# Patient Record
Sex: FEMALE | Race: WHITE | NOT HISPANIC OR LATINO | Employment: FULL TIME | URBAN - METROPOLITAN AREA
[De-identification: names, ages, dates, MRNs, and addresses within clinical notes are randomized per-mention and may not be internally consistent; named-entity substitution may affect disease eponyms.]

---

## 2019-12-30 NOTE — PROGRESS NOTES
Dianne Najera presents today to establish care.    CHIEF COMPLAINT:  Establish Care (new patient); Anxiety; Depression; and motion sickness    HPI     Anxiety/Depression: chronic, controlled with effexor 225mg daily.  Pt need new prescription as she changed insurances and her new insurance will not cover the 225mg, but will cover 3 75mg tablets daily. Seeing therapist and the therapist recommended she she a psychiatrist for med management.  She made an appointment, but the earliest she could get in was 6 months from now.     Motion sickness:  Traveling to [place] and gets extreme motion sickness.  Has used patches in the past and they have worked well for her.     Hx migraines: thinks they are menses/hormone related and stress related.   Is interested in contraception management.     Review of Systems   Constitutional: Negative for chills, diaphoresis, fatigue, fever and unexpected weight loss.   Eyes: Negative for blurred vision and visual disturbance.   Respiratory: Negative for cough and shortness of breath.    Cardiovascular: Negative for chest pain, palpitations and leg swelling.   Gastrointestinal: Negative for abdominal pain, constipation, diarrhea, nausea and vomiting.   Musculoskeletal: Negative for neck stiffness.   Skin: Negative for rash.   Neurological: Negative for dizziness, light-headedness and headache.   Psychiatric/Behavioral: Positive for stress. Negative for sleep disturbance and depressed mood. The patient is not nervous/anxious.       Past Medical History:   Diagnosis Date   • Migraines    • Pap smear for cervical cancer screening 2018   • Seizures (CMS/HCC)     has had 4 total in her lifetime      Past Surgical History:   Procedure Laterality Date   • WISDOM TOOTH EXTRACTION        Family History   Problem Relation Age of Onset   • Hypotension Mother    • Migraines Mother    • Hypertension Father    • Heart failure Maternal Grandmother    • Esophageal cancer Maternal Grandfather       Social  "History     Socioeconomic History   • Marital status: Single     Spouse name: Not on file   • Number of children: Not on file   • Years of education: Not on file   • Highest education level: Not on file   Tobacco Use   • Smoking status: Never Smoker   • Smokeless tobacco: Never Used   Substance and Sexual Activity   • Alcohol use: Yes     Drinks per session: 1 or 2   • Drug use: Never   • Sexual activity: Defer   Lifestyle   • Physical activity:     Days per week: 4 days     Minutes per session: 30 min   • Stress: To some extent        Current Outpatient Medications:   •  ondansetron ODT (ZOFRAN ODT) 8 MG disintegrating tablet, Place 1 tablet on the tongue Every 8 (Eight) Hours As Needed for Nausea or Vomiting for up to 10 days., Disp: 12 tablet, Rfl: 0  •  Scopolamine (TRANSDERM-SCOP, 1.5 MG,) 1.5 MG/3DAYS patch, Place 1 patch on the skin as directed by provider Every 72 (Seventy-Two) Hours., Disp: 10 patch, Rfl: 0  •  venlafaxine XR (EFFEXOR-XR) 75 MG 24 hr capsule, Take 3 capsules by mouth Daily for 30 days., Disp: 90 capsule, Rfl: 5    No Known Allergies     Visit Vitals  BP 98/64 (BP Location: Right arm, Patient Position: Sitting)   Pulse 65   Temp 97 °F (36.1 °C) (Temporal)   Ht 167.6 cm (66\")   Wt 91.4 kg (201 lb 6.4 oz)   SpO2 98%   BMI 32.51 kg/m²      Physical Exam   Constitutional: She is oriented to person, place, and time. Vital signs are normal. She appears well-developed and well-nourished. She is cooperative.  Non-toxic appearance. She does not have a sickly appearance. She does not appear ill. No distress. She is obese.  HENT:   Head: Normocephalic.   Right Ear: Hearing normal.   Left Ear: Hearing normal.   Eyes: Pupils are equal, round, and reactive to light. Conjunctivae are normal.   Cardiovascular: Normal rate, regular rhythm and normal heart sounds.   Pulmonary/Chest: Effort normal and breath sounds normal. No respiratory distress. She has no wheezes.   Neurological: She is alert and oriented " to person, place, and time. She has normal strength.   Skin: Skin is warm, dry and intact. No rash noted. She is not diaphoretic.   Psychiatric: She has a normal mood and affect. Her speech is normal and behavior is normal. Judgment and thought content normal.   Vitals reviewed.     ASSESSMENT/PLAN  Diagnoses and all orders for this visit:    1. Anxiety (Primary)  -     venlafaxine XR (EFFEXOR-XR) 75 MG 24 hr capsule; Take 3 capsules by mouth Daily for 30 days.  Dispense: 90 capsule; Refill: 5  Stable.  Continue taking effexor as prescribed.  Will refill medication.  Provided patient with alternative psychiatry group if she would like to get in before 6 months.  Continue therapy.      2. Recurrent major depressive disorder, remission status unspecified (CMS/HCC)  -     venlafaxine XR (EFFEXOR-XR) 75 MG 24 hr capsule; Take 3 capsules by mouth Daily for 30 days.  Dispense: 90 capsule; Refill: 5  Stable.  Continue taking effexor as prescribed.  Will refill medication.  Provided patient with alternative psychiatry group if she would like to get in before 6 months.  Continue therapy.  To ER for SI/HI.    3. Motion sickness, initial encounter  -     Scopolamine (TRANSDERM-SCOP, 1.5 MG,) 1.5 MG/3DAYS patch; Place 1 patch on the skin as directed by provider Every 72 (Seventy-Two) Hours.  Dispense: 10 patch; Refill: 0  -     ondansetron ODT (ZOFRAN ODT) 8 MG disintegrating tablet; Place 1 tablet on the tongue Every 8 (Eight) Hours As Needed for Nausea or Vomiting for up to 10 days.  Dispense: 12 tablet; Refill: 0  Will prescribe scopolamine patches and Zofran as needed for motion sickness.     4. Encounter for counseling regarding contraception  -     Ambulatory Referral to Obstetrics / Gynecology  Will refer to OB/GYN for well women exam and contraceptive management.     Return in about 3 months (around 3/31/2020) for Annual.    Patient instructed to follow up with our office for results on any labs/imaging ordered during  this visit.  Patient verbalizes understanding and agrees to treatment plan.

## 2019-12-31 ENCOUNTER — OFFICE VISIT (OUTPATIENT)
Dept: INTERNAL MEDICINE | Facility: CLINIC | Age: 24
End: 2019-12-31

## 2019-12-31 VITALS
HEIGHT: 66 IN | HEART RATE: 65 BPM | SYSTOLIC BLOOD PRESSURE: 98 MMHG | WEIGHT: 201.4 LBS | BODY MASS INDEX: 32.37 KG/M2 | DIASTOLIC BLOOD PRESSURE: 64 MMHG | OXYGEN SATURATION: 98 % | TEMPERATURE: 97 F

## 2019-12-31 DIAGNOSIS — F33.9 RECURRENT MAJOR DEPRESSIVE DISORDER, REMISSION STATUS UNSPECIFIED (HCC): ICD-10-CM

## 2019-12-31 DIAGNOSIS — F41.9 ANXIETY: Primary | ICD-10-CM

## 2019-12-31 DIAGNOSIS — T75.3XXA MOTION SICKNESS, INITIAL ENCOUNTER: ICD-10-CM

## 2019-12-31 DIAGNOSIS — Z30.09 ENCOUNTER FOR COUNSELING REGARDING CONTRACEPTION: ICD-10-CM

## 2019-12-31 PROCEDURE — 99204 OFFICE O/P NEW MOD 45 MIN: CPT | Performed by: NURSE PRACTITIONER

## 2019-12-31 RX ORDER — VENLAFAXINE HYDROCHLORIDE 225 MG/1
1 TABLET, EXTENDED RELEASE ORAL DAILY
COMMUNITY
Start: 2019-12-21 | End: 2019-12-31 | Stop reason: SDUPTHER

## 2019-12-31 RX ORDER — ONDANSETRON 8 MG/1
8 TABLET, ORALLY DISINTEGRATING ORAL EVERY 8 HOURS PRN
Qty: 12 TABLET | Refills: 0 | Status: SHIPPED | OUTPATIENT
Start: 2019-12-31 | End: 2020-01-10

## 2019-12-31 RX ORDER — SCOLOPAMINE TRANSDERMAL SYSTEM 1 MG/1
1 PATCH, EXTENDED RELEASE TRANSDERMAL
Qty: 10 PATCH | Refills: 0 | Status: SHIPPED | OUTPATIENT
Start: 2019-12-31 | End: 2020-08-24

## 2019-12-31 RX ORDER — VENLAFAXINE HYDROCHLORIDE 75 MG/1
225 CAPSULE, EXTENDED RELEASE ORAL DAILY
Qty: 90 CAPSULE | Refills: 5 | Status: SHIPPED | OUTPATIENT
Start: 2019-12-31 | End: 2020-08-24

## 2020-03-27 ENCOUNTER — TELEPHONE (OUTPATIENT)
Dept: INTERNAL MEDICINE | Facility: CLINIC | Age: 25
End: 2020-03-27

## 2020-07-16 ENCOUNTER — TELEPHONE (OUTPATIENT)
Dept: INTERNAL MEDICINE | Facility: CLINIC | Age: 25
End: 2020-07-16

## 2020-07-21 RX ORDER — VENLAFAXINE HYDROCHLORIDE 75 MG/1
225 CAPSULE, EXTENDED RELEASE ORAL DAILY
Qty: 90 CAPSULE | Refills: 0 | Status: SHIPPED | OUTPATIENT
Start: 2020-07-21 | End: 2020-08-24

## 2020-07-21 NOTE — TELEPHONE ENCOUNTER
I spoke with patient and she does not have enough of the venlaflaxine ER 75 mg to get her through until an appointment with psych.  Can you send in 30 days until she can be seen.  I made her an appointment next week for a physical with Dr. Rothman.

## 2020-07-27 ENCOUNTER — TELEPHONE (OUTPATIENT)
Dept: INTERNAL MEDICINE | Facility: CLINIC | Age: 25
End: 2020-07-27

## 2020-07-27 NOTE — TELEPHONE ENCOUNTER
PT CALLED AND SAID SHE HAS HAD POSSIBLE EXPOSURE TO THE VIRUS AND WANTED TO KNOW ABOUT TESTING; SHE HAS NO SYMPTOMS; PLEASE ADVISE    DIMA: [phone]

## 2020-08-24 ENCOUNTER — OFFICE VISIT (OUTPATIENT)
Dept: INTERNAL MEDICINE | Facility: CLINIC | Age: 25
End: 2020-08-24

## 2020-08-24 VITALS
SYSTOLIC BLOOD PRESSURE: 118 MMHG | HEART RATE: 83 BPM | WEIGHT: 210 LBS | DIASTOLIC BLOOD PRESSURE: 82 MMHG | HEIGHT: 66 IN | BODY MASS INDEX: 33.75 KG/M2 | TEMPERATURE: 97.3 F | OXYGEN SATURATION: 98 %

## 2020-08-24 DIAGNOSIS — F32.A ANXIETY AND DEPRESSION: Primary | ICD-10-CM

## 2020-08-24 DIAGNOSIS — F41.9 ANXIETY AND DEPRESSION: Primary | ICD-10-CM

## 2020-08-24 PROCEDURE — 99214 OFFICE O/P EST MOD 30 MIN: CPT | Performed by: FAMILY MEDICINE

## 2020-08-24 RX ORDER — VENLAFAXINE HYDROCHLORIDE 75 MG/1
225 CAPSULE, EXTENDED RELEASE ORAL DAILY
Qty: 90 CAPSULE | Refills: 1 | Status: SHIPPED | OUTPATIENT
Start: 2020-08-24 | End: 2020-10-23 | Stop reason: SDUPTHER

## 2020-08-24 NOTE — PATIENT INSTRUCTIONS
Generalized Anxiety Disorder, Adult  Generalized anxiety disorder (FRANCIS) is a mental health disorder. People with this condition constantly worry about everyday events. Unlike normal anxiety, worry related to FRANCIS is not triggered by a specific event. These worries also do not fade or get better with time. FRANCIS interferes with life functions, including relationships, work, and school.  FRANCIS can vary from mild to severe. People with severe FRANCIS can have intense waves of anxiety with physical symptoms (panic attacks).  What are the causes?  The exact cause of FRANCIS is not known.  What increases the risk?  This condition is more likely to develop in:  · Women.  · People who have a family history of anxiety disorders.  · People who are very shy.  · People who experience very stressful life events, such as the death of a loved one.  · People who have a very stressful family environment.  What are the signs or symptoms?  People with FRANCIS often worry excessively about many things in their lives, such as their health and family. They may also be overly concerned about:  · Doing well at work.  · Being on time.  · Natural disasters.  · Friendships.  Physical symptoms of FRANCIS include:  · Fatigue.  · Muscle tension or having muscle twitches.  · Trembling or feeling shaky.  · Being easily startled.  · Feeling like your heart is pounding or racing.  · Feeling out of breath or like you cannot take a deep breath.  · Having trouble falling asleep or staying asleep.  · Sweating.  · Nausea, diarrhea, or irritable bowel syndrome (IBS).  · Headaches.  · Trouble concentrating or remembering facts.  · Restlessness.  · Irritability.  How is this diagnosed?  Your health care provider can diagnose FRANCIS based on your symptoms and medical history. You will also have a physical exam. The health care provider will ask specific questions about your symptoms, including how severe they are, when they started, and if they come and go. Your health care  provider may ask you about your use of alcohol or drugs, including prescription medicines. Your health care provider may refer you to a mental health specialist for further evaluation.  Your health care provider will do a thorough examination and may perform additional tests to rule out other possible causes of your symptoms.  To be diagnosed with FRANCIS, a person must have anxiety that:  · Is out of his or her control.  · Affects several different aspects of his or her life, such as work and relationships.  · Causes distress that makes him or her unable to take part in normal activities.  · Includes at least three physical symptoms of FRANCIS, such as restlessness, fatigue, trouble concentrating, irritability, muscle tension, or sleep problems.  Before your health care provider can confirm a diagnosis of FRANCIS, these symptoms must be present more days than they are not, and they must last for six months or longer.  How is this treated?  The following therapies are usually used to treat FRANCIS:  · Medicine. Antidepressant medicine is usually prescribed for long-term daily control. Antianxiety medicines may be added in severe cases, especially when panic attacks occur.  · Talk therapy (psychotherapy). Certain types of talk therapy can be helpful in treating FRANCIS by providing support, education, and guidance. Options include:  ? Cognitive behavioral therapy (CBT). People learn coping skills and techniques to ease their anxiety. They learn to identify unrealistic or negative thoughts and behaviors and to replace them with positive ones.  ? Acceptance and commitment therapy (ACT). This treatment teaches people how to be mindful as a way to cope with unwanted thoughts and feelings.  ? Biofeedback. This process trains you to manage your body's response (physiological response) through breathing techniques and relaxation methods. You will work with a therapist while machines are used to monitor your physical symptoms.  · Stress  management techniques. These include yoga, meditation, and exercise.  A mental health specialist can help determine which treatment is best for you. Some people see improvement with one type of therapy. However, other people require a combination of therapies.  Follow these instructions at home:  · Take over-the-counter and prescription medicines only as told by your health care provider.  · Try to maintain a normal routine.  · Try to anticipate stressful situations and allow extra time to manage them.  · Practice any stress management or self-calming techniques as taught by your health care provider.  · Do not punish yourself for setbacks or for not making progress.  · Try to recognize your accomplishments, even if they are small.  · Keep all follow-up visits as told by your health care provider. This is important.  Contact a health care provider if:  · Your symptoms do not get better.  · Your symptoms get worse.  · You have signs of depression, such as:  ? A persistently sad, cranky, or irritable mood.  ? Loss of enjoyment in activities that used to bring you michelle.  ? Change in weight or eating.  ? Changes in sleeping habits.  ? Avoiding friends or family members.  ? Loss of energy for normal tasks.  ? Feelings of guilt or worthlessness.  Get help right away if:  · You have serious thoughts about hurting yourself or others.  If you ever feel like you may hurt yourself or others, or have thoughts about taking your own life, get help right away. You can go to your nearest emergency department or call:  · Your local emergency services (911 in the [place]  · A suicide crisis helpline, such as the National Suicide Prevention Lifeline at 1-512.162.3378. This is open 24 hours a day.  Summary  · Generalized anxiety disorder (FRANCIS) is a mental health disorder that involves worry that is not triggered by a specific event.  · People with FRANCIS often worry excessively about many things in their lives, such as their health and  family.  · FRANCIS may cause physical symptoms such as restlessness, trouble concentrating, sleep problems, frequent sweating, nausea, diarrhea, headaches, and trembling or muscle twitching.  · A mental health specialist can help determine which treatment is best for you. Some people see improvement with one type of therapy. However, other people require a combination of therapies.  This information is not intended to replace advice given to you by your health care provider. Make sure you discuss any questions you have with your health care provider.  Document Released: 04/14/2014 Document Revised: 11/30/2018 Document Reviewed: 11/07/2017  Elsevier Patient Education © 2020 [employer]

## 2020-08-24 NOTE — PROGRESS NOTES
Subjective     Dianne Najera is a 24 y.o. female.     Chief Complaint   Patient presents with   • Depression     f/u   • Anxiety       History of Present Illness     Pt WAS FOLLOWING WITH ELSI Castro  To Est[facility] care with me today.       Anxiety/Depression:   CHRONIC , DX MANY YEARS AGO.  Her sx when she has anxiety are HA, racing HR, feels dizzy, poor conc.  Her anxiety worse with stress and everything around her make her anxious.  She is on Effexor 225 mg daily for long time, no S/E reported.  She is following with therapist   Looking for Psych to accept her insurance.  Her sx well tolerated with current Rx.  Denies SI/SA  Her insurance dose not cover the 225mg, but will cover 3 pills of 75mg tablets daily.     The following portions of the patient's history were reviewed and updated as appropriate: allergies, current medications, past family history, past medical history, past social history, past surgical history and problem list.    Review of Systems   Constitutional: Negative for activity change, appetite change, chills, fatigue and fever.   HENT: Negative for congestion, ear discharge, ear pain, rhinorrhea, sore throat, swollen glands, trouble swallowing and voice change.    Eyes: Negative for discharge and visual disturbance.   Respiratory: Negative for apnea, cough, chest tightness, shortness of breath, wheezing and stridor.    Cardiovascular: Negative for chest pain, palpitations and leg swelling.   Gastrointestinal: Negative for abdominal distention, abdominal pain, nausea and vomiting.   Endocrine: Negative for cold intolerance and heat intolerance.   Genitourinary: Negative for decreased urine volume, difficulty urinating, dysuria, vaginal bleeding, vaginal discharge and vaginal pain.   Musculoskeletal: Negative for arthralgias, back pain and neck pain.   Skin: Negative for color change, pallor and rash.   Neurological: Negative for dizziness, seizures, speech difficulty, headache, memory problem and  confusion.   Psychiatric/Behavioral: Positive for stress. Negative for agitation, behavioral problems, decreased concentration, sleep disturbance, suicidal ideas and depressed mood. The patient is nervous/anxious.    All other systems reviewed and are negative.      Vitals:    08/24/20 1542   BP: 118/82   Pulse: 83   Temp: 97.3 °F (36.3 °C)   SpO2: 98%           08/24/20  1542   Weight: 95.3 kg (210 lb)         Body mass index is 33.89 kg/m².      Current Outpatient Medications   Medication Sig Dispense Refill   • venlafaxine XR (EFFEXOR-XR) 75 MG 24 hr capsule Take 3 capsules by mouth Daily. 90 capsule 1     No current facility-administered medications for this visit.      PHQ-2 Depression Screening  Little interest or pleasure in doing things? 0   Feeling down, depressed, or hopeless? 0   PHQ-2 Total Score 0                Objective   Physical Exam   Constitutional: She is oriented to person, place, and time. She appears well-developed and well-nourished. No distress.   HENT:   Head: Normocephalic.   Mouth/Throat: Oropharynx is clear and moist.   Eyes: Conjunctivae are normal.   Neck: Normal range of motion. No thyromegaly present.   Cardiovascular: Normal rate, regular rhythm and normal heart sounds.   No murmur heard.  Pulmonary/Chest: Effort normal and breath sounds normal. No respiratory distress. She has no wheezes.   Neurological: She is alert and oriented to person, place, and time.   Skin: Skin is warm. No rash noted. No erythema. No pallor.   Psychiatric: She has a normal mood and affect. Her behavior is normal. Judgment and thought content normal.   Nursing note and vitals reviewed.        Assessment/Plan   Dianne was seen today for depression and anxiety.    Diagnoses and all orders for this visit:    Anxiety and depression  -     venlafaxine XR (EFFEXOR-XR) 75 MG 24 hr capsule; Take 3 capsules by mouth Daily.               I have fully discussed with patient physical examination findings, results of any  lab tests, diagnosis,  plan of treatment and follow up or referrals  I have discussed the SIDE EFFECT OF MEDICATION and importance TO report any side effect , the patient expressed good understanding.     Screening for Depression done , please see [facility] PHQ 2/9     Home care discussed     All questions answered  Patient verbalized full understanding and agrees with the plan .

## 2020-09-10 ENCOUNTER — OFFICE VISIT (OUTPATIENT)
Dept: INTERNAL MEDICINE | Facility: CLINIC | Age: 25
End: 2020-09-10

## 2020-09-10 VITALS
BODY MASS INDEX: 33.46 KG/M2 | DIASTOLIC BLOOD PRESSURE: 78 MMHG | TEMPERATURE: 97.1 F | SYSTOLIC BLOOD PRESSURE: 108 MMHG | WEIGHT: 208.2 LBS | HEART RATE: 97 BPM | OXYGEN SATURATION: 98 % | HEIGHT: 66 IN

## 2020-09-10 DIAGNOSIS — R87.619 ABNORMAL CERVICAL PAPANICOLAOU SMEAR, UNSPECIFIED ABNORMAL PAP FINDING: ICD-10-CM

## 2020-09-10 DIAGNOSIS — D22.9 BENIGN MOLE: Primary | ICD-10-CM

## 2020-09-10 PROBLEM — R87.610 ATYPICAL SQUAMOUS CELLS OF UNDETERMINED SIGNIFICANCE ON CYTOLOGIC SMEAR OF CERVIX (ASC-US): Status: ACTIVE | Noted: 2020-09-10

## 2020-09-10 PROCEDURE — 99213 OFFICE O/P EST LOW 20 MIN: CPT | Performed by: FAMILY MEDICINE

## 2020-09-10 NOTE — PROGRESS NOTES
Subjective     Dianne Najera is a 25 y.o. female.     Chief Complaint   Patient presents with   • .Bump     bump on back of left earlobe (black spot) Not sure how how long-noted 2 days ago       History of Present Illness     Pt noticed black spot on her Lt earlobe  2 days ago, not sure for how long she has it, dose not hurt.no redness/swelling.  She has many moles on her body.    H/o abnormal Pap smear 2018 when she was in [place] , was considering to get LEEP in [place] but her 6 month PAP was ok  concidering to do leep in [place]. DUE FOR ANOTHER PAP.  No h/o STD      The following portions of the patient's history were reviewed and updated as appropriate: allergies, current medications, past family history, past medical history, past social history, past surgical history and problem list.    Review of Systems   Constitutional: Negative for activity change, appetite change, chills, fatigue and fever.   Respiratory: Negative for cough, choking, shortness of breath, wheezing and stridor.    Cardiovascular: Negative for chest pain, palpitations and leg swelling.   Genitourinary: Negative for decreased urine volume, menstrual problem, urgency, urinary incontinence, vaginal bleeding, vaginal discharge and vaginal pain.   Musculoskeletal: Negative for arthralgias, back pain and gait problem.   Skin: Positive for color change and skin lesions.   Psychiatric/Behavioral: Negative for agitation, behavioral problems, decreased concentration, sleep disturbance, suicidal ideas and stress. The patient is nervous/anxious.    All other systems reviewed and are negative.      Vitals:    09/10/20 0841   BP: 108/78   Pulse: 97   Temp: 97.1 °F (36.2 °C)   SpO2: 98%           09/10/20  0841   Weight: 94.4 kg (208 lb 3.2 oz)         Body mass index is 33.6 kg/m².      Current Outpatient Medications   Medication Sig Dispense Refill   • venlafaxine XR (EFFEXOR-XR) 75 MG 24 hr capsule Take 3 capsules by mouth Daily. 90 capsule 1     No current  facility-administered medications for this visit.             Objective   Physical Exam   Constitutional: She is oriented to person, place, and time. She appears well-nourished. No distress.   HENT:   Mouth/Throat: Oropharynx is clear and moist.   Eyes: Conjunctivae are normal.   Neck: Neck supple.   Cardiovascular: Normal rate, regular rhythm and normal heart sounds.   No murmur heard.  Neurological: She is alert and oriented to person, place, and time.   Skin: Skin is warm.   0.3 x 0.3 cm mole at the Lt earlobe , no color Changes with no irregularity , surface looks smooth.    Many moles of different sizes distributed on her body   Psychiatric: She has a normal mood and affect. Her behavior is normal. Judgment and thought content normal.   Nursing note and vitals reviewed.        Assessment/Plan   Dianne was seen today for .bump.    Diagnoses and all orders for this visit:    Benign mole;  - Close monitoring for any change in size/color /irregularity     Abnormal cervical Papanicolaou smear, unspecified abnormal pap finding;  - She was not sure what was her previous PAP, will get record from her previous GYN at [place]  - PAP next visit     I have fully discussed the nature of the medical condition(s) risks, complications, management, safe and proper use of medications.     Encouraged medication compliance and the importance of keeping scheduled follow up appointments with me and any other providers.    Patient instructed to follow up with our office for results on any labs/imaging ordered during this visit.    Home care discussed  All questions answered  Patient verbalizes understanding and agrees to treatment plan.

## 2020-09-10 NOTE — PATIENT INSTRUCTIONS
Mole  A mole is a colored (pigmented) growth on the skin. Moles are very common. They are usually harmless, but some moles can become cancerous over time.  What are the causes?  Moles are caused when pigmented skin cells grow together in clusters instead of spreading out in the skin as they normally do. The reason why the skin cells grow together in clusters is not known.  What increases the risk?  You are more likely to develop a mole if you:  · Have family members who have moles.  · Are white.  · Have blond hair.  · Are often outdoors and exposed to the sun.  · Received phototherapy when you were a  baby.  · Are male.  What are the signs or symptoms?  A mole may be:  · Brown or black.  · Flat or raised.  · Smooth or wrinkled.  How is this diagnosed?  A mole is diagnosed with a skin exam. If your health care provider thinks a mole may be cancerous, all or part of the mole will be removed for testing (biopsy).  How is this treated?  Most moles are noncancerous (benign) and do not require treatment. If a mole is found to be cancerous, it will be removed. You may also choose to have a mole removed if it is causing pain or if you do not like the way it looks.  Follow these instructions at home:    General instructions    · Every month, look for new moles and check your existing moles for changes. This is important because a change in a mole can mean that the mole has become cancerous.  · ABCDE changes in a mole indicate that you should be evaluated by your health care provider. ABCDE stands for:  ? Asymmetry. This means the mole has an irregular shape. It is not round or oval.  ? Border. This means the mole has an irregular or bumpy border.  ? Color. This means the mole has multiple colors in it, including brown, black, blue, red, or tan. Note that it is normal for moles to get darker when a woman is pregnant or takes birth control pills.  ? Diameter. This means the mole is more than 0.2 inches (6 mm)  across.  ? Evolving. This refers to any unusual changes or symptoms in the mole, such as pain, itching, stinging, sensitivity, or bleeding.  · If you have a large number of moles, see a skin doctor (dermatologist) at least one time every year for a full-body skin check.  Lifestyle  · When you are outdoors, wear sunscreen with SPF 30 (sun protection factor 30) or higher.  · Use an adequate amount of sunscreen to cover exposed areas of skin. Put it on 30 minutes before you go out. Reapply it every 2 hours or anytime you come out of the water.  · When you are out in the sun, wear a broad-brimmed hat and clothing that covers your arms and legs. Wear wraparound sunglasses.  Contact a health care provider if:  · The size, shape, borders, or color of your mole changes.  · Your mole, or the skin near the mole, becomes painful, sore, red, or swollen.  · Your mole:  ? Develops more than one color.  ? Itches or bleeds.  ? Becomes scaly, sheds skin, or oozes fluid.  ? Becomes flat or develops raised areas.  ? Becomes hard or soft.  · You develop a new mole.  Summary  · A mole is a colored (pigmented) growth on the skin. Moles are very common. They are usually harmless, but some moles can become cancerous over time.  · Every month, look for new moles and check your existing moles for changes. This is important because a change in a mole can mean that the mole has become cancerous.  · If you have a large number of moles, see a skin doctor (dermatologist) at least one time every year for a full-body skin check.  · When you are outdoors, wear sunscreen with SPF 30 (sun protection factor 30) or higher. Reapply it every 2 hours or anytime you come out of the water.  · Contact a health care provider if you notice changes in a mole or if you develop a new mole.  This information is not intended to replace advice given to you by your health care provider. Make sure you discuss any questions you have with your health care  provider.  Document Released: 09/12/2002 Document Revised: 05/14/2019 Document Reviewed: 05/14/2019  Elsevier Patient Education © 2020 [employer]

## 2020-10-23 DIAGNOSIS — F41.9 ANXIETY AND DEPRESSION: ICD-10-CM

## 2020-10-23 DIAGNOSIS — F32.A ANXIETY AND DEPRESSION: ICD-10-CM

## 2020-10-23 NOTE — TELEPHONE ENCOUNTER
Caller: Dianne Najera    Relationship: Self    Best call back number: [phone]    Medication needed:   Requested Prescriptions     Pending Prescriptions Disp Refills   • venlafaxine XR (EFFEXOR-XR) 75 MG 24 hr capsule 90 capsule 1     Sig: Take 3 capsules by mouth Daily.       When do you need the refill by: 10/23/20    What details did the patient provide when requesting the medication: Patient cannot get in to see Psychology until 10/28/20.  Patient is asking for a refill until that appointment.    Does the patient have less than a 3 day supply:  [x] Yes  [] No    What is the patient's preferred pharmacy: [id number] - [place], [place] - [address] AT [facility] [address]  [phone] [facility] [phone] FX

## 2020-10-25 RX ORDER — VENLAFAXINE HYDROCHLORIDE 75 MG/1
225 CAPSULE, EXTENDED RELEASE ORAL DAILY
Qty: 90 CAPSULE | Refills: 1 | Status: SHIPPED | OUTPATIENT
Start: 2020-10-25

## 2020-10-26 ENCOUNTER — OFFICE VISIT (OUTPATIENT)
Dept: INTERNAL MEDICINE | Facility: CLINIC | Age: 25
End: 2020-10-26

## 2020-10-26 VITALS
DIASTOLIC BLOOD PRESSURE: 84 MMHG | TEMPERATURE: 97.3 F | WEIGHT: 208 LBS | HEIGHT: 66 IN | OXYGEN SATURATION: 98 % | SYSTOLIC BLOOD PRESSURE: 110 MMHG | HEART RATE: 95 BPM | BODY MASS INDEX: 33.43 KG/M2

## 2020-10-26 DIAGNOSIS — Z01.419 WOMEN'S ANNUAL ROUTINE GYNECOLOGICAL EXAMINATION: Primary | ICD-10-CM

## 2020-10-26 PROCEDURE — 99395 PREV VISIT EST AGE 18-39: CPT | Performed by: FAMILY MEDICINE

## 2020-10-26 NOTE — PROGRESS NOTES
Subjective     Dianne Najera is a 25 y.o. female.     Chief Complaint   Patient presents with   • Gynecologic Exam       History of Present Illness   Pt with h/o abnormal PAP in the past , then she had another PAP 6 months after , was neg per pt.  LMP was 10/5  Has normal cycle   No new concern  No abnormal discharge     The following portions of the patient's history were reviewed and updated as appropriate: allergies, current medications, past family history, past medical history, past social history, past surgical history and problem list.    Review of Systems   Gastrointestinal: Negative for abdominal pain, diarrhea, nausea and GERD.   Genitourinary: Negative for decreased urine volume, difficulty urinating, dyspareunia, dysuria, flank pain, frequency, genital sores, hematuria, menstrual problem, pelvic pain, pelvic pressure, urgency, urinary incontinence, vaginal bleeding, vaginal discharge and vaginal pain.   Musculoskeletal: Negative for arthralgias, back pain, gait problem and joint swelling.   Psychiatric/Behavioral: Negative for agitation, behavioral problems, decreased concentration, sleep disturbance, suicidal ideas and stress. The patient is not nervous/anxious.    All other systems reviewed and are negative.      Vitals:    10/26/20 1031   BP: 110/84   Pulse: 95   Temp: 97.3 °F (36.3 °C)   SpO2: 98%           10/26/20  1031   Weight: 94.3 kg (208 lb)         Body mass index is 33.57 kg/m².      Current Outpatient Medications   Medication Sig Dispense Refill   • venlafaxine XR (EFFEXOR-XR) 75 MG 24 hr capsule Take 3 capsules by mouth Daily. 90 capsule 1     No current facility-administered medications for this visit.                 Objective   Physical Exam  Vitals signs and nursing note reviewed. Exam conducted with a chaperone present.   Constitutional:       General: She is not in acute distress.     Appearance: She is well-developed. She is obese. She is not ill-appearing, toxic-appearing or  diaphoretic.   HENT:      Head: Normocephalic.      Mouth/Throat:      Mouth: Mucous membranes are moist.      Pharynx: Oropharynx is clear.   Neck:      Thyroid: No thyromegaly.   Abdominal:      General: Bowel sounds are normal. There is no distension.      Palpations: Abdomen is soft. There is no mass.      Tenderness: There is no abdominal tenderness. There is no guarding or rebound.      Hernia: No hernia is present. There is no hernia in the left inguinal area or right inguinal area.   Genitourinary:     General: Normal vulva.      Exam position: Lithotomy position.      Labia:         Right: No rash, tenderness, lesion or injury.         Left: No rash, tenderness, lesion or injury.       Urethra: No urethral lesion.      Vagina: No signs of injury and foreign body. No vaginal discharge, erythema, tenderness, bleeding or lesions.      Cervix: No discharge, friability, lesion or erythema.      Uterus: Not enlarged and not tender.       Adnexa:         Right: No mass or tenderness.          Left: No mass or tenderness.     Lymphadenopathy:      Lower Body: No right inguinal adenopathy. No left inguinal adenopathy.   Skin:     General: Skin is warm.      Coloration: Skin is not jaundiced or pale.      Findings: No erythema or rash.   Neurological:      Mental Status: She is alert and oriented to person, place, and time.      Motor: No abnormal muscle tone.      Deep Tendon Reflexes: Reflexes normal.   Psychiatric:         Mood and Affect: Mood normal.         Behavior: Behavior normal.         Thought Content: Thought content normal.         Judgment: Judgment normal.           Assessment/Plan   Diagnoses and all orders for this visit:    1. Women's annual routine gynecological examination (Primary)  -     Liquid-based Pap Smear, Screening          I have fully discussed the nature of the medical condition(s) risks, complications, management, safe and proper use of medications.   I have discussed the SIDE EFFECT  OF MEDICATION and importance TO report any side effect , the patient expressed good understanding.  Encouraged medication compliance and the importance of keeping scheduled follow up appointments with me and any other providers.    Patient instructed to follow up with our office for results on any labs/imaging ordered during this visit.    Home care discussed  All questions answered  Patient verbalizes understanding and agrees to treatment plan.

## 2020-10-26 NOTE — PATIENT INSTRUCTIONS
Pap Test  Why am I having this test?  A Pap test, also called a Pap smear, is a screening test to check for signs of:  · Cancer of the vagina, cervix, and uterus. The cervix is the lower part of the uterus that opens into the vagina.  · Infection.  · Changes that may be a sign that cancer is developing (precancerous changes).  Women need this test on a regular basis. In general, you should have a Pap test every 3 years until you reach menopause or age 65. Women aged 30-60 may choose to have their Pap test done at the same time as an HPV (human papillomavirus) test every 5 years (instead of every 3 years).  Your health care provider may recommend having Pap tests more or less often depending on your medical conditions and past Pap test results.  What kind of sample is taken?    Your health care provider will collect a sample of cells from the surface of your cervix. This will be done using a small cotton swab, plastic spatula, or brush. This sample is often collected during a pelvic exam, when you are lying on your back on an exam table with feet in footrests (stirrups).  In some cases, fluids (secretions) from the cervix or vagina may also be collected.  How do I prepare for this test?  · Be aware of where you are in your menstrual cycle. If you are menstruating on the day of the test, you may be asked to reschedule.  · You may need to reschedule if you have a known vaginal infection on the day of the test.  · Follow instructions from your health care provider about:  ? Changing or stopping your regular medicines. Some medicines can cause abnormal test results, such as digitalis and tetracycline.  ? Avoiding douching or taking a bath the day before or the day of the test.  Tell a health care provider about:  · Any allergies you have.  · All medicines you are taking, including vitamins, herbs, eye drops, creams, and over-the-counter medicines.  · Any blood disorders you have.  · Any surgeries you have had.  · Any  medical conditions you have.  · Whether you are pregnant or may be pregnant.  How are the results reported?  Your test results will be reported as either abnormal or normal.  A false-positive result can occur. A false positive is incorrect because it means that a condition is present when it is not.  A false-negative result can occur. A false negative is incorrect because it means that a condition is not present when it is.  What do the results mean?  A normal test result means that you do not have signs of cancer of the vagina, cervix, or uterus.  An abnormal result may mean that you have:  · Cancer. A Pap test by itself is not enough to diagnose cancer. You will have more tests done in this case.  · Precancerous changes in your vagina, cervix, or uterus.  · Inflammation of the cervix.  · An STD (sexually transmitted disease).  · A fungal infection.  · A parasite infection.  Talk with your health care provider about what your results mean.  Questions to ask your health care provider  Ask your health care provider, or the department that is doing the test:  · When will my results be ready?  · How will I get my results?  · What are my treatment options?  · What other tests do I need?  · What are my next steps?  Summary  · In general, women should have a Pap test every 3 years until they reach menopause or age 65.  · Your health care provider will collect a sample of cells from the surface of your cervix. This will be done using a small cotton swab, plastic spatula, or brush.  · In some cases, fluids (secretions) from the cervix or vagina may also be collected.  This information is not intended to replace advice given to you by your health care provider. Make sure you discuss any questions you have with your health care provider.  Document Released: 03/09/2004 Document Revised: 08/27/2018 Document Reviewed: 08/27/2018  Elsevier Patient Education © 2020 [employer]

## 2020-11-04 ENCOUNTER — TELEPHONE (OUTPATIENT)
Dept: INTERNAL MEDICINE | Facility: CLINIC | Age: 25
End: 2020-11-04

## 2020-11-04 DIAGNOSIS — R87.612 LOW GRADE SQUAMOUS INTRAEPITHELIAL LESION ON CYTOLOGIC SMEAR OF CERVIX (LGSIL): Primary | ICD-10-CM

## 2020-11-04 NOTE — TELEPHONE ENCOUNTER
----- Message from Chito Rothman MD sent at 11/4/2020 10:47 AM EST -----  PLEASE call for abnormal PAP results, she needs to see GYN  Referral done

## 2020-11-05 ENCOUNTER — TELEPHONE (OUTPATIENT)
Dept: INTERNAL MEDICINE | Facility: CLINIC | Age: 25
End: 2020-11-05

## 2020-11-05 NOTE — TELEPHONE ENCOUNTER
PT  CALLED STATED THAT REFERRAL FOR GYN. HAS BEEN SCHEDULED FOR 2 MONTHS OUT, PT WOULD LIKE TO KNOW WHETHER OR NOT IT IS URGENT FOR HER TO SEE GYN IF SO SHE CAN SEE ONE IN [place] SINCE SHE IS GOING OUT THERE IN 2 WEEKS.    PLEASE ADVISE.  CALL BACK:[phone]

## 2020-11-10 PROCEDURE — U0003 INFECTIOUS AGENT DETECTION BY NUCLEIC ACID (DNA OR RNA); SEVERE ACUTE RESPIRATORY SYNDROME CORONAVIRUS 2 (SARS-COV-2) (CORONAVIRUS DISEASE [COVID-19]), AMPLIFIED PROBE TECHNIQUE, MAKING USE OF HIGH THROUGHPUT TECHNOLOGIES AS DESCRIBED BY CMS-2020-01-R: HCPCS | Performed by: NURSE PRACTITIONER
